# Patient Record
Sex: MALE | Race: WHITE | HISPANIC OR LATINO | URBAN - METROPOLITAN AREA
[De-identification: names, ages, dates, MRNs, and addresses within clinical notes are randomized per-mention and may not be internally consistent; named-entity substitution may affect disease eponyms.]

---

## 2022-05-17 ENCOUNTER — EMERGENCY (EMERGENCY)
Facility: HOSPITAL | Age: 52
LOS: 1 days | Discharge: HOME | End: 2022-05-17
Attending: EMERGENCY MEDICINE
Payer: MEDICAID

## 2022-05-17 VITALS
WEIGHT: 205.03 LBS | DIASTOLIC BLOOD PRESSURE: 102 MMHG | SYSTOLIC BLOOD PRESSURE: 156 MMHG | HEIGHT: 73 IN | TEMPERATURE: 99 F | HEART RATE: 90 BPM | OXYGEN SATURATION: 98 % | RESPIRATION RATE: 18 BRPM

## 2022-05-17 PROCEDURE — 73060 X-RAY EXAM OF HUMERUS: CPT

## 2022-05-17 PROCEDURE — 99284 EMERGENCY DEPT VISIT MOD MDM: CPT

## 2022-05-17 PROCEDURE — 73070 X-RAY EXAM OF ELBOW: CPT | Mod: 26,LT

## 2022-05-17 PROCEDURE — 73110 X-RAY EXAM OF WRIST: CPT

## 2022-05-17 PROCEDURE — 93005 ELECTROCARDIOGRAM TRACING: CPT

## 2022-05-17 PROCEDURE — 99284 EMERGENCY DEPT VISIT MOD MDM: CPT | Mod: 25

## 2022-05-17 PROCEDURE — 93010 ELECTROCARDIOGRAM REPORT: CPT

## 2022-05-17 PROCEDURE — 73030 X-RAY EXAM OF SHOULDER: CPT | Mod: 26,LT

## 2022-05-17 PROCEDURE — 73030 X-RAY EXAM OF SHOULDER: CPT

## 2022-05-17 PROCEDURE — 73060 X-RAY EXAM OF HUMERUS: CPT | Mod: 26,LT

## 2022-05-17 PROCEDURE — 73070 X-RAY EXAM OF ELBOW: CPT

## 2022-05-17 PROCEDURE — 73110 X-RAY EXAM OF WRIST: CPT | Mod: 26,LT

## 2022-05-17 RX ORDER — ACETAMINOPHEN 500 MG
650 TABLET ORAL ONCE
Refills: 0 | Status: COMPLETED | OUTPATIENT
Start: 2022-05-17 | End: 2022-05-17

## 2022-05-17 RX ORDER — OXYCODONE HYDROCHLORIDE 5 MG/1
5 TABLET ORAL ONCE
Refills: 0 | Status: DISCONTINUED | OUTPATIENT
Start: 2022-05-17 | End: 2022-05-17

## 2022-05-17 RX ADMIN — OXYCODONE HYDROCHLORIDE 5 MILLIGRAM(S): 5 TABLET ORAL at 10:12

## 2022-05-17 RX ADMIN — Medication 650 MILLIGRAM(S): at 09:07

## 2022-05-17 NOTE — ED PROVIDER NOTE - PHYSICAL EXAMINATION
General: NAD, well appearing  HEENT: Normocephalic, atraumatic  Neck: No apparent stiffness or JVD  Pulm: Chest wall symmetric and nontender, lungs clear to ascultation   Cardiac: Regular rate and regular rhythm, radial pulses equal  Abdomen: Nontender and nondistended  Skin: Skin is warm, dry and intact without rashes or lesions.  Neuro: No motor or sensory deficits andrew in arm  MSK: No deformity or tenderness above subjective wincing on passive ROM of L arm

## 2022-05-17 NOTE — ED PROVIDER NOTE - CARE PROVIDER_API CALL
Landon Pearce)  Orthopaedic Surgery  [address] [place] [address]  Phone: [phone]  Fax: [phone]  Follow Up Time: 7-10 Days

## 2022-05-17 NOTE — CONSULT NOTE ADULT - SUBJECTIVE AND OBJECTIVE BOX
Patient is a 51y LHD Male presenting to the emergency department with a chief complaint of left shoulder pain located lateral proximal deltoid and anterior aspect as well as left lateral elbow around the epicondyle as patient indicates. 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 Patient denies neck pain, wrist pain, numbness/tingling but notes he cant lift his left shoulder. Patient denies history of left shoulder problems or surgery.     REVIEW OF SYSTEMS    General: denies fever, chills	    Skin/Breast: denies rashes  	  Respiratory and Thorax: denies SOB  	  Cardiovascular:	denies CP    Gastrointestinal:	denies abdominal pain    Musculoskeletal:	 + left shoulder and elbow pain    Neurological:	denies paresthesias    PAST MEDICAL & SURGICAL HISTORY: inguinal hernia repair  HTN    Allergies: Motrin (Unknown)    Medications: see med rec    FAMILY HISTORY:  : non-contributory    Social History: lives at home    PHYSICAL EXAM:    Vital Signs Last 24 Hrs  T(C): 37 (17 May 2022 07:43), Max: 37 (17 May 2022 07:43)  T(F): 98.6 (17 May 2022 07:43), Max: 98.6 (17 May 2022 07:43)  HR: 90 (17 May 2022 07:43) (90 - 90)  BP: 156/102 (17 May 2022 07:43) (156/102 - 156/102)  BP(mean): --  RR: 18 (17 May 2022 07:43) (18 - 18)  SpO2: 98% (17 May 2022 07:43) (98% - 98%)    Appearance: Alert, responsive, in no acute distress.  Left UE:  Shoulder skin intact, no erythema or ecchymosis, no open wounds, no obvious swelling  + severe TTP over lateral GT and anterior GH joint bic groove region. Clavicle grossly NT however some tenderness noted over AC joint however mild   AROM - minimal - PROM Abduction 30 degrees limited by pain        TTP proximal humerus however mild  Left elbow: FROM, olcranon and medial epicondyle NT, TTP over lateral epicondyle pin point. Bic insertion intact NT  WF/WE intact, AIN/PIN/intrinsics intact  SILT Rad/med/uln distrib. Rad pulse +2    Imaging Studies: < from: Xray Shoulder 2 Views, Left (05.17.22 @ 08:59) >    PROCEDURE DATE:  05/17/2022          INTERPRETATION:  Clinical history: 51-year-old male, pain.    Three views of the left shoulder, left elbow and wrist were obtained   without comparison.    FINDINGS: Mild degenerative change with no fracture, effusion or   dislocation.    Subcentimetercalcification projected superior to the humerus is most   consistent with calcific tendinosis.    A slightly irregular 8 mm sclerotic focus in the proximal humeral shaft   is most consistent with osteonecrosis. If there is continued clinical   concern follow-up bone scan can be ordered..    IMPRESSION:  No acute radiographic findings, recommend follow-up as clinically   indicated    --- End of Report ---    MARIAMA HEADLEY DO; Attending Radiologist  This document has been electronicallysigned. May 17 2022 10:06AM    < end of copied text >    A/P:  Pt is a  51y Male with left elbow lateral epicondylitis and left shoulder calcific tendinitis    PLAN:   NSAIDs/pain control  Sling for comfort  Lateral epicondyle elbow brace  F/u outpatient for eval and poss left shoulder cortisone injection  D/w Dr. Pearce

## 2022-05-17 NOTE — ED PROVIDER NOTE - OBJECTIVE STATEMENT
51M smoker, HTN c/o left arm pain. 5 days ago injured his L arm doing housework, next day had COVID booster in same arm.  Since then it has been painful to move arm at the joints, wrist elbow and shoulder.  Otherwise bleeding, SOB, chest pain, abdominal pain or fevers.  Denies other pertinent medical problems.  Denies any other substance use.

## 2022-05-17 NOTE — ED PROVIDER NOTE - PATIENT PORTAL LINK FT
You can access the FollowMyHealth Patient Portal offered by [facility] by registering at the following website: [email or web address]. By joining [employer]’s FollowMyHealth portal, you will also be able to view your health information using other applications (apps) compatible with our system.

## 2022-05-17 NOTE — ED PROVIDER NOTE - CLINICAL SUMMARY MEDICAL DECISION MAKING FREE TEXT BOX
51M smoker, HTN c/o left arm pain. No alarming symptoms or exam findings, per hx sounds MSK vs vaccine rxn, xrays and ekg ordered.

## 2022-05-17 NOTE — CONSULT NOTE ADULT - NS ATTEND AMEND GEN_ALL_CORE FT
Orthopaedic Trauma Surgeon Addendum:    I have personally performed a face-to-face diagnostic evaluation on this patient.  I have reviewed the physician assistant note and agree with the history, exam, and plan of care, except as noted. No acute ortho surgical intervention needed. Plan for outpatient follow up with PMD or PM&R for non-operative management of symptoms.    Landon Pearce MD  Orthopaedic Trauma Surgeon  [facility]

## 2022-05-17 NOTE — ED PROVIDER NOTE - NSFOLLOWUPINSTRUCTIONS_ED_ALL_ED_FT
Follow up with your orthopedist.  There were incidental abnormal findings seen on your imaging today.  Please follow up with them doctor to investigate these findings as we discussed.   Symptoms include pain, swelling, or bruising. Rest that area over the next several days and slowly resume activity when tolerated. Ice can help with swelling and pain. Use Tylenol as needed.     SEEK IMMEDIATE MEDICAL CARE IF YOU HAVE ANY OF THE FOLLOWING SYMPTOMS: worsening pain, inability to move that body part, numbness or tingling.   SEEK IMMEDIATE MEDICAL CARE IF YOU HAVE ANY OF THE FOLLOWING SYMPTOMS: worsening chest pain, coughing up blood, unexplained back/neck/jaw pain, severe abdominal pain, dizziness or lightheadedness, fainting, shortness of breath, sweaty or clammy skin, vomiting, or racing heart beat. These symptoms may represent a serious problem that is an emergency. Do not wait to see if the symptoms will go away. Get medical help right away. Call 911 and do not drive yourself to the hospital.

## 2022-05-17 NOTE — ED PROVIDER NOTE - PROGRESS NOTE DETAILS
Pt has spot of osteonecrosis [?] in humerus in addition to tendonitis, ortho consulted who recommend conservative treatment and outpatient follow up.  Workup is unremarkable for any emergent process.   Patient is now feeling improved and wishes to leave.  Patient / family members have capacity.    Patient/family was given full return precautions, counseled on red flag symptoms such as LOC, fever, severe pain, or focal deficits and advised to return to the ED for these reasons or any reason that was concerning to them. Patient/family was informed of all significant and incidental findings found on this workup today and all results were reviewed.   All questions were answered, advised to make close follow up with their primary care provider and specialty clinics (as applicable) to follow up with this visit and continue investigation/treatment.   Patient/family has shown adequate understanding and is agreeable to the plan.

## 2022-05-17 NOTE — ED PROVIDER NOTE - ATTENDING CONTRIBUTION TO CARE
Dr. Dacosta : I have personally seen and examined this patient at the bedside. I have fully participated in the care of this patient. I have reviewed all pertinent clinical information, including history, physical exam, plan and the Resident's note and agree except as noted.       51M smoker, HTN c/o left arm pain. notes on friday was picking things up at home felt soreness to left arm, next day had COVID booster in same arm.  Since then it has been painful to move arm. + pain to shoulder/deltoid area.       Denies f/c/n/v/cp/sob/palpitations/cough/abd.pain/d/c/dysuria/hematuria. no sick contacts/recent travel.    PE:  head; atraumatic normocephalic  eyes: perrla  Heart: rrr s1s2  lungs: ctab  abd: soft, nt nd + bs no rebound/guarding no cva ttp  le: no swelling no calf ttp  ue: radial pulse 2+; ulnar pulse 2+ cap refill <2sec from to wrist /elbow ttp to left  no swelling no erythema; limited rom at shoulder due to pain  back: no midline cervical/thoracic/lumbar ttp      -->calcific tendonitis? will fu xray; pain control reassess

## 2022-09-07 PROBLEM — Z00.00 ENCOUNTER FOR PREVENTIVE HEALTH EXAMINATION: Status: ACTIVE | Noted: 2022-09-07
